# Patient Record
(demographics unavailable — no encounter records)

---

## 2024-10-10 NOTE — PHYSICAL EXAM
[NL] : warm, clear [FreeTextEntry9] : no masses; hyperactive bowel sounds; no HSM; soft, no distention, no tenderness with deep palpation

## 2024-10-10 NOTE — HISTORY OF PRESENT ILLNESS
[de-identified] : mom reports pt w ST today, N/D overnight -multiple episoodes, +UO, appetite OK, mom denies fever, SOB, wheezing, lethargy, signs of dehydration [FreeTextEntry6] : NBNB emesis overnight - started after school yesterday. Sore throat today. Large volume liquid stools overnight. Afebrile. Eating/drinking ok. Normal urination.

## 2024-10-10 NOTE — DISCUSSION/SUMMARY
[FreeTextEntry1] : 3y F seen for acute visit. Rapid strep neg. IF TC positive, Amox 400/5 5mL PO BID x 10 days. - Discussed with pt / family gastroenteritis diagnosis including etiologies, natural course, possible complications, and treatment options.  Discussed pt's current hydration status.   - Discussed with family signs/symptoms of dehydration including presence of the following: lack of tears, dry lips, dry tongue, dry mouth, decreased frequency of and/or volume of urination, lethargy, increased heart rate.  Should any signs of dehydration arise or worsen, family is to call office back for re evaluation.   - Discussed importance of fluids over solid foods.  Recommended use of clear fluids initially and to advance diet as tolerated.   Clear fluids can include, but are not limited to Pedialyte (preferred), Gatorade, broth, juice (white grape preferred), water, popsicles, Jello. Discussed use of frequent, small amounts of fluids if vomiting is frequent or unable to keep fluids down. Resume normal diet if vomiting has ceased, and diarrhea is less than 6 times daily. BRAT diet.  May advance to starchy solids as tolerated. Continue to advance to general diet as tolerated.   - Discussed possible temporary lactose intolerance as recover from gastroenteritis.  -  Discussed with family that  symptoms may persists for up to several weeks, but typically approximately 1 week.  Call for follow up if worsening or persisting greater than 1 week.  Call if child appears to have altered consciousness or is very lethargic.  Also, call if there are concerns the child is becoming dehydrated or vomiting continues more than 48 hours. - Discussed contagious nature of stool in gastroenteritis and stressed good hygiene to control spread of illness.  Pt may return to activity when diarrhea has stopped. RTO PRN.

## 2024-10-23 NOTE — REVIEW OF SYSTEMS
[Fever] : no fever [Chills] : no chills [Malaise] : malaise [Difficulty with Sleep] : no difficulty with sleep [Headache] : no headache [Nasal Discharge] : no nasal discharge [Nasal Congestion] : nasal congestion [Sore Throat] : sore throat [Dysuria] : no dysuria [Polyuria] : no polyuria [Hematuria] : no hematuria [Vaginal Dischage] : no vaginal discharge [Vaginal Itch] : no vaginal itch [Negative] : Heme/Lymph

## 2024-10-23 NOTE — PHYSICAL EXAM
[Tired appearing] : not tired appearing [Lethargic] : not lethargic [Toxic] : not toxic [Stridor] : no stridor [Erythema] : no erythema [Erythematous Oropharynx] : erythematous oropharynx [Enlarged Tonsils] : tonsils not enlarged [Vesicles] : no vesicles [Exudate] : no exudate [Ulcerative Lesions] : no ulcerative lesions [Palate petechiae] : palate without petechiae [Wheezing] : no wheezing [Rales] : no rales [Crackles] : no crackles [Transmitted Upper Airway Sounds] : no transmitted upper airway sounds [Rhonchi] : no rhonchi [Normal External Genitalia] : normal external genitalia [Excoriations in Perineum] : no excoriations in perineum [NL] : warm, clear [FreeTextEntry6] : scant erythema of labia minor, no excoriation, no discharge or odor

## 2024-10-23 NOTE — PLAN
[TextEntry] : Take antibiotics as prescribed, take entirety of course even if child is feeling better or fever free. Continue Acetaminophen and ibuprofen as needed for pain/fever/discomfort. Rest and keep hydrated. Return to school/activities once fever free for minimum of 24 hours off of antipyretics or/and on antibiotics for at least 24 hours. Once on antibiotics for 24 hours, replace toothbrush, toothpaste, wash sheets/pillowcases, and wash pets' fur if application. Can use saltwater gargles if applicable to age and situation.   If symptoms worsen or persist, return to office.

## 2024-10-23 NOTE — HISTORY OF PRESENT ILLNESS
[de-identified] : per mom pt has congestion and has not been acting like her normal self, per mom she has also been having vaginal odor  [FreeTextEntry6] : TMAX 100F temp today, not acting like herself today after , no symptoms last few days, but was sick over a week ago with stomach bug mom thought vaginal area had odor, but no discharge/bleeding, mild erythema in vaginal area, currently toilet training, no constipation, no dysuria/burning on urination, no odorous urine, no dark colored urine no cough, no respiratory distress, no wheezing or dyspnea. No rashes, no lethargy No abdominal pain, no vomiting or diarrhea Voiding normally, eating and drinking well. No concern for dehydration. In day care  No recent travel. No other concerns at this time

## 2024-12-16 NOTE — PLAN
Patent
[TextEntry] : symptomatic treatment fluids intake handwashing and infection control discussed recheck if worse/not improving

## 2024-12-16 NOTE — HISTORY OF PRESENT ILLNESS
[de-identified] : Mom states pt developed a rash on Saturday. No fever. Mom mentioned pt coughing and congested for 4 days.  [FreeTextEntry6] : temp to 100.8 no vomiting, no diarrhea decreased appetite

## 2025-02-18 NOTE — DISCUSSION/SUMMARY
[Normal Growth] : growth [Normal Development] : development  [No Elimination Concerns] : elimination [Continue Regimen] : feeding [No Skin Concerns] : skin [Normal Sleep Pattern] : sleep [None] : no medical problems [School Readiness] : school readiness [Healthy Personal Habits] : healthy personal habits [TV/Media] : tv/media [Child and Family Involvement] : child and family involvement [Safety] : safety [Anticipatory Guidance Given] : Anticipatory guidance addressed as per the history of present illness section [No Medications] : ~He/She~ is not on any medications [] : The components of the vaccine(s) to be administered today are listed in the plan of care. The disease(s) for which the vaccine(s) are intended to prevent and the risks have been discussed with the caretaker.  The risks are also included in the appropriate vaccination information statements which have been provided to the patient's caregiver.  The caregiver has given consent to vaccinate. [FreeTextEntry1] : 4y F seen for WCC. Appropriate interval growth. Normal exam. No developmental concerns at this time. Vaccines as per schedule. RTO 1 year for 5y WCC.

## 2025-02-18 NOTE — HISTORY OF PRESENT ILLNESS
[Mother] : mother [Normal] : Normal [Brushing teeth] : Brushing teeth [Toothpaste] : Primary Fluoride Source: Toothpaste [Playtime (60 min/d)] : Playtime 60 min a day [Appropiate parent-child communication] : Appropriate parent-child communication [Parent has appropriate responses to behavior] : Parent has appropriate responses to behavior [No] : No cigarette smoke exposure [Car seat in back seat] : Car seat in back seat [Carbon Monoxide Detectors] : Carbon monoxide detectors [Smoke Detectors] : Smoke detectors [Supervised outdoor play] : Supervised outdoor play [Exposure to electronic nicotine delivery system] : No exposure to electronic nicotine delivery system [FreeTextEntry7] : 4 yr [facility] [de-identified] : good eater, variety of food groups, water ad magali

## 2025-02-18 NOTE — PHYSICAL EXAM

## 2025-02-18 NOTE — HISTORY OF PRESENT ILLNESS
[Mother] : mother [Normal] : Normal [Brushing teeth] : Brushing teeth [Toothpaste] : Primary Fluoride Source: Toothpaste [Playtime (60 min/d)] : Playtime 60 min a day [Appropiate parent-child communication] : Appropriate parent-child communication [Parent has appropriate responses to behavior] : Parent has appropriate responses to behavior [No] : No cigarette smoke exposure [Car seat in back seat] : Car seat in back seat [Carbon Monoxide Detectors] : Carbon monoxide detectors [Smoke Detectors] : Smoke detectors [Supervised outdoor play] : Supervised outdoor play [Exposure to electronic nicotine delivery system] : No exposure to electronic nicotine delivery system [FreeTextEntry7] : 4 yr [facility] [de-identified] : good eater, variety of food groups, water ad magali

## 2025-03-03 NOTE — HISTORY OF PRESENT ILLNESS
[de-identified] : dad reports pt w cold sore, fever tmax 102.0, fatigue, fussy, x 1 day, +UO, appetite decreased, dad denies NVD, SOB, wheezing [FreeTextEntry6] : Fever, fatigue and fussiness starting today, Tmax 102, no OTC medication given No cough or congestion No SOB, no difficulty breathing, tachypnea, retractions, wheeze, stridor No vomiting, diarrhea, abdominal pain, rash No headache, sore throat Decreased appetite, drinking and urinating well Has sore inside lower lip

## 2025-03-03 NOTE — DISCUSSION/SUMMARY
[FreeTextEntry1] : Rapid flu and rapid strep negative Discussed with father viral illness and mouth ulcer Throat cx sent- if positive will start Cefadroxil 500mg/5ml- 3 ml BID x 10 days May use Tylenol or Ibuprofen as needed for fever or discomfort. Recommend supportive care including increased fluids, rest, avoid salty or spicy foods that can make mouth sore more painful Return if symptoms worsen or persist.

## 2025-03-03 NOTE — PHYSICAL EXAM
[Erythema] : no erythema [Wheezing] : no wheezing [Rales] : no rales [Tachypnea] : no tachypnea [Rhonchi] : no rhonchi [de-identified] : aphthous ulcer inside lower lip

## 2025-07-10 NOTE — PHYSICAL EXAM
[TextEntry] : General: alert and active in no apparent distress OP: no tonsillar enlargement/exudate, no lesions, no posterior pharyngeal erythema Lungs: clear to auscultation bilaterally, good air exchange, no retractions, comfortable WOB CVS: Normal rate, regular rhythm, no murmur Abdomen: no suprapubic tenderness, soft, nondistended, nontender, and no hepatosplenomegaly or masses, normoactive bowel sounds, no CVAT BL : mild erythema of vulva, without discharge or lesions, no labial adhesions, no perianal lesions or redness

## 2025-07-10 NOTE — PLAN
[TextEntry] : Allow air to circulate. Avoid letting children sit in wet swimsuits for long periods of time. Do not use bubble baths or perfumed soaps. Emollients may help protect skin.  If urine cx is +, start cephalexin 250mg/5mL at 10mL BID x 7 days  Return for new or worsening symptoms  I spent 31 minutes on face to face patient care, counseling and educating the patient/family/caregiver, ordering/reviewing tests

## 2025-07-10 NOTE — HISTORY OF PRESENT ILLNESS
[de-identified] : frequent urination, urination in small amounts, urgeness to go started today, itchiness and grabbing gentital area since yesterday, No fever, No NVD, eating and drinking good, no any other symptoms reported [FreeTextEntry6] : In addition to above: Denies dysuria, hematuria, vaginal discharge or rash Denies ST, recent illness NO prior HX of UTI No known urinary tract anomalies Swimming lately